# Patient Record
Sex: MALE | Race: WHITE | NOT HISPANIC OR LATINO | Employment: OTHER | URBAN - METROPOLITAN AREA
[De-identification: names, ages, dates, MRNs, and addresses within clinical notes are randomized per-mention and may not be internally consistent; named-entity substitution may affect disease eponyms.]

---

## 2024-08-01 ENCOUNTER — ALLIED HEALTH/NURSE VISIT (OUTPATIENT)
Dept: FAMILY MEDICINE | Facility: CLINIC | Age: 79
End: 2024-08-01

## 2024-08-01 ENCOUNTER — TELEPHONE (OUTPATIENT)
Dept: FAMILY MEDICINE | Facility: CLINIC | Age: 79
End: 2024-08-01

## 2024-08-01 DIAGNOSIS — T30.0 BURN: Primary | ICD-10-CM

## 2024-08-01 PROCEDURE — 99207 PR NO CHARGE NURSE ONLY: CPT

## 2024-08-01 NOTE — PROGRESS NOTES
See Telephone Encounter for visit documentation    Conner Esteves, RN, BSN, PHN  [facility]
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
